# Patient Record
(demographics unavailable — no encounter records)

---

## 2025-05-12 NOTE — ASSESSMENT
[FreeTextEntry1] : 57 yr old female with worsening chronic headaches now radiating from neck to vertex with dizziness not responsive to Tylenol. MRI Brain, MRA Brain and PT for neck ordered. NSAIDs c/I due to gastric ulcer. Will f/u with Dr. Mccauley for re-evaluation in 4-6 weeks.   Supervising Physician : Mauricio Mccauley MD

## 2025-05-12 NOTE — HISTORY OF PRESENT ILLNESS
[FreeTextEntry1] : Today : Today I had the pleasure of seeing Ms. SPENCER in our office for follow up.  Their past medical history and imaging have been reviewed.   Patient is a 57 year old woman last seen in our office 10.26.23 for left sided headaches, bilateral carpal tunnel syndrome and chronic cervical and lumbar radiculopathy. She has not been seen in 2 years and today presents for evaluation of worsening posterior head pain that travels from her posterior neck to the vertex of her head accompanied by dizziness that is not responding to Tylenol as her headaches did in the past. She denies nausea, vision changes, emesis or syncope but does admit to mild sensitivity to light and sound. She states this has been happening 3-4x a week and lasts over 10 hours. She denies radiation down the arms, upper extremity weakness or trauma.  Patient requests an MRI of the Brain to rule out intracranial pathology. Today we discussed starting physical therapy for her neck as well as abortive therapy for headache. Patient states she has chronic GERD and gastric ulcer, declines NSAID.

## 2025-05-12 NOTE — PHYSICAL EXAM
[FreeTextEntry1] : PHYSICAL EXAMINATION: Head: Normocephalic, atraumatic. Negative TA tenderness/prominence.   Neck: Supple with full range of motion; nontender with negative bilateral Spurling's signs.   Spine: Full range of motion; nontender. Negative straight leg raise maneuvers.   Extremities: Non-tender. Atraumatic. Negative Tinel's signs.   NEUROLOGICAL EXAMINATION:   Mental Status: Patient is a good informant with intact orientation, attention, concentration, recent and remote memory. Language evaluation reveals no evidence of aphasia. Fund of knowledge is normal.   Cranial Nerves Cranial Nerves:   II, III, IV, VI: Pupils are equal, round, and reactive to light and accommodation. No evidence of afferent pupillary defect. Visual fields are full to confrontation. Eye movements are full without evidence of nystagmus or internuclear ophthalmoplegia. Funduscopic examination reveals sharp disc margins.   V: Normal jaw movements. Normal facial sensation.   VII: Normal facial motor testing.   VIII: Grossly normal hearing bilaterally.   IX, X: Palate moves symmetrically. No dysarthria.   XI: Normal shoulder shrug and sternocleidomastoid power.   XII: Tongue appears normal and protrudes in the midline.   Motor: Normal bulk, tone, and power throughout.   Muscle Stretch Reflexes (right/left): 2+ symmetrical.   Plantar Responses: Flexor bilaterally.   Coordination: Normal finger to nose and heel to shin testing, no truncal ataxia and no tremor.   Sensation: Normal primary sensation. Normal double simultaneous stimulation.   Gait and Station: Normal base, stride, and turning. Normal toe and heel walking. Normal tandem. Negative Romberg.

## 2025-06-13 NOTE — ASSESSMENT
[FreeTextEntry1] : Impression is of headaches and cervicodynia. Because her symptoms include the posterior cervical area, I am requesting an MRI of the cervical spine. We will see her in follow-up for reevaluation.     Entered by Gi Bourne acting as scribe for Dr. Mccauley.     The documentation recorded by the scribe, in my presence, accurately reflects the service I personally performed, and the decisions made by me with my edits as appropriate. Mauricio Mccauley MD, FAAN, FACP Diplomate [employer].

## 2025-06-13 NOTE — HISTORY OF PRESENT ILLNESS
[FreeTextEntry1] : Today : Today I had the pleasure of seeing Ms. SPENCER in our office for follow up.  Their past medical history and imaging have been reviewed.   Patient is a 57 year old woman last seen in our office 10.26.23 for left sided headaches, bilateral carpal tunnel syndrome and chronic cervical and lumbar radiculopathy. She has not been seen in 2 years and today presents for evaluation of worsening posterior head pain that travels from her posterior neck to the vertex of her head accompanied by dizziness that is not responding to Tylenol as her headaches did in the past. She denies nausea, vision changes, emesis or syncope but does admit to mild sensitivity to light and sound. She states this has been happening 3-4x a week and lasts over 10 hours. She denies radiation down the arms, upper extremity weakness or trauma.  Patient requests an MRI of the Brain to rule out intracranial pathology. Today we discussed starting physical therapy for her neck as well as abortive therapy for headache. Patient states she has chronic GERD and gastric ulcer, declines NSAID. MRI of the brain was normal. MRA of the brain was normal.